# Patient Record
Sex: FEMALE | Race: WHITE | NOT HISPANIC OR LATINO | URBAN - METROPOLITAN AREA
[De-identification: names, ages, dates, MRNs, and addresses within clinical notes are randomized per-mention and may not be internally consistent; named-entity substitution may affect disease eponyms.]

---

## 2024-08-14 ENCOUNTER — EMERGENCY (EMERGENCY)
Facility: HOSPITAL | Age: 39
LOS: 1 days | Discharge: HOME | End: 2024-08-14
Attending: EMERGENCY MEDICINE
Payer: COMMERCIAL

## 2024-08-14 VITALS
WEIGHT: 266.32 LBS | SYSTOLIC BLOOD PRESSURE: 141 MMHG | HEART RATE: 115 BPM | OXYGEN SATURATION: 100 % | DIASTOLIC BLOOD PRESSURE: 100 MMHG | RESPIRATION RATE: 18 BRPM | TEMPERATURE: 98 F

## 2024-08-14 LAB
ALBUMIN SERPL ELPH-MCNC: 4.4 G/DL — SIGNIFICANT CHANGE UP (ref 3.3–5.2)
ALP SERPL-CCNC: 93 U/L — SIGNIFICANT CHANGE UP (ref 40–120)
ALT FLD-CCNC: 20 U/L — SIGNIFICANT CHANGE UP
ANION GAP SERPL CALC-SCNC: 14 MMOL/L — SIGNIFICANT CHANGE UP (ref 5–17)
APPEARANCE UR: CLEAR — SIGNIFICANT CHANGE UP
AST SERPL-CCNC: 16 U/L — SIGNIFICANT CHANGE UP
BACTERIA # UR AUTO: NEGATIVE /HPF — SIGNIFICANT CHANGE UP
BASOPHILS # BLD AUTO: 0.05 K/UL — SIGNIFICANT CHANGE UP (ref 0–0.2)
BASOPHILS NFR BLD AUTO: 0.5 % — SIGNIFICANT CHANGE UP (ref 0–2)
BILIRUB SERPL-MCNC: 0.5 MG/DL — SIGNIFICANT CHANGE UP (ref 0.4–2)
BILIRUB UR-MCNC: ABNORMAL
BUN SERPL-MCNC: 15.5 MG/DL — SIGNIFICANT CHANGE UP (ref 8–20)
CALCIUM SERPL-MCNC: 9.7 MG/DL — SIGNIFICANT CHANGE UP (ref 8.4–10.5)
CHLORIDE SERPL-SCNC: 101 MMOL/L — SIGNIFICANT CHANGE UP (ref 96–108)
CO2 SERPL-SCNC: 24 MMOL/L — SIGNIFICANT CHANGE UP (ref 22–29)
COLOR SPEC: ABNORMAL
CREAT SERPL-MCNC: 0.64 MG/DL — SIGNIFICANT CHANGE UP (ref 0.5–1.3)
DIFF PNL FLD: NEGATIVE — SIGNIFICANT CHANGE UP
EGFR: 116 ML/MIN/1.73M2 — SIGNIFICANT CHANGE UP
EOSINOPHIL # BLD AUTO: 0.07 K/UL — SIGNIFICANT CHANGE UP (ref 0–0.5)
EOSINOPHIL NFR BLD AUTO: 0.7 % — SIGNIFICANT CHANGE UP (ref 0–6)
GAS PNL BLDV: SIGNIFICANT CHANGE UP
GLUCOSE SERPL-MCNC: 89 MG/DL — SIGNIFICANT CHANGE UP (ref 70–99)
GLUCOSE UR QL: NEGATIVE MG/DL — SIGNIFICANT CHANGE UP
HCG SERPL-ACNC: <4 MIU/ML — SIGNIFICANT CHANGE UP
HCT VFR BLD CALC: 39.8 % — SIGNIFICANT CHANGE UP (ref 34.5–45)
HGB BLD-MCNC: 13.1 G/DL — SIGNIFICANT CHANGE UP (ref 11.5–15.5)
IMM GRANULOCYTES NFR BLD AUTO: 0.8 % — SIGNIFICANT CHANGE UP (ref 0–0.9)
KETONES UR-MCNC: NEGATIVE MG/DL — SIGNIFICANT CHANGE UP
LEUKOCYTE ESTERASE UR-ACNC: ABNORMAL
LIDOCAIN IGE QN: 32 U/L — SIGNIFICANT CHANGE UP (ref 22–51)
LYMPHOCYTES # BLD AUTO: 19.6 % — SIGNIFICANT CHANGE UP (ref 13–44)
LYMPHOCYTES # BLD AUTO: 2.05 K/UL — SIGNIFICANT CHANGE UP (ref 1–3.3)
MCHC RBC-ENTMCNC: 28.4 PG — SIGNIFICANT CHANGE UP (ref 27–34)
MCHC RBC-ENTMCNC: 32.9 GM/DL — SIGNIFICANT CHANGE UP (ref 32–36)
MCV RBC AUTO: 86.3 FL — SIGNIFICANT CHANGE UP (ref 80–100)
MONOCYTES # BLD AUTO: 0.66 K/UL — SIGNIFICANT CHANGE UP (ref 0–0.9)
MONOCYTES NFR BLD AUTO: 6.3 % — SIGNIFICANT CHANGE UP (ref 2–14)
NEUTROPHILS # BLD AUTO: 7.53 K/UL — HIGH (ref 1.8–7.4)
NEUTROPHILS NFR BLD AUTO: 72.1 % — SIGNIFICANT CHANGE UP (ref 43–77)
NITRITE UR-MCNC: POSITIVE
PH UR: 5 — SIGNIFICANT CHANGE UP (ref 5–8)
PLATELET # BLD AUTO: 294 K/UL — SIGNIFICANT CHANGE UP (ref 150–400)
POTASSIUM SERPL-MCNC: 4.2 MMOL/L — SIGNIFICANT CHANGE UP (ref 3.5–5.3)
POTASSIUM SERPL-SCNC: 4.2 MMOL/L — SIGNIFICANT CHANGE UP (ref 3.5–5.3)
PROT SERPL-MCNC: 8.1 G/DL — SIGNIFICANT CHANGE UP (ref 6.6–8.7)
PROT UR-MCNC: SIGNIFICANT CHANGE UP MG/DL
RBC # BLD: 4.61 M/UL — SIGNIFICANT CHANGE UP (ref 3.8–5.2)
RBC # FLD: 13.4 % — SIGNIFICANT CHANGE UP (ref 10.3–14.5)
RBC CASTS # UR COMP ASSIST: 2 /HPF — SIGNIFICANT CHANGE UP (ref 0–4)
SODIUM SERPL-SCNC: 139 MMOL/L — SIGNIFICANT CHANGE UP (ref 135–145)
SP GR SPEC: 1.02 — SIGNIFICANT CHANGE UP (ref 1–1.03)
SQUAMOUS # UR AUTO: 10 /HPF — HIGH (ref 0–5)
UROBILINOGEN FLD QL: 1 MG/DL — SIGNIFICANT CHANGE UP (ref 0.2–1)
WBC # BLD: 10.44 K/UL — SIGNIFICANT CHANGE UP (ref 3.8–10.5)
WBC # FLD AUTO: 10.44 K/UL — SIGNIFICANT CHANGE UP (ref 3.8–10.5)
WBC UR QL: 1 /HPF — SIGNIFICANT CHANGE UP (ref 0–5)

## 2024-08-14 PROCEDURE — 99285 EMERGENCY DEPT VISIT HI MDM: CPT

## 2024-08-14 PROCEDURE — 74176 CT ABD & PELVIS W/O CONTRAST: CPT | Mod: 26,MC

## 2024-08-14 RX ORDER — KETOROLAC TROMETHAMINE 10 MG
15 TABLET ORAL ONCE
Refills: 0 | Status: DISCONTINUED | OUTPATIENT
Start: 2024-08-14 | End: 2024-08-14

## 2024-08-14 RX ORDER — BACTERIOSTATIC SODIUM CHLORIDE 0.9 %
1000 VIAL (ML) INJECTION ONCE
Refills: 0 | Status: COMPLETED | OUTPATIENT
Start: 2024-08-14 | End: 2024-08-14

## 2024-08-14 RX ORDER — HYDROMORPHONE HCL IN 0.9% NACL 0.2 MG/ML
0.5 PLASTIC BAG, INJECTION (ML) INTRAVENOUS ONCE
Refills: 0 | Status: DISCONTINUED | OUTPATIENT
Start: 2024-08-14 | End: 2024-08-14

## 2024-08-14 RX ORDER — METHOCARBAMOL 500 MG
1000 TABLET ORAL ONCE
Refills: 0 | Status: COMPLETED | OUTPATIENT
Start: 2024-08-14 | End: 2024-08-14

## 2024-08-14 RX ORDER — ACETAMINOPHEN 500 MG
1000 TABLET ORAL ONCE
Refills: 0 | Status: COMPLETED | OUTPATIENT
Start: 2024-08-14 | End: 2024-08-14

## 2024-08-14 RX ORDER — ONDANSETRON HCL/PF 4 MG/2 ML
4 VIAL (ML) INJECTION ONCE
Refills: 0 | Status: COMPLETED | OUTPATIENT
Start: 2024-08-14 | End: 2024-08-14

## 2024-08-14 RX ADMIN — Medication 1000 MILLIGRAM(S): at 22:52

## 2024-08-14 RX ADMIN — Medication 1000 MILLILITER(S): at 22:53

## 2024-08-14 RX ADMIN — Medication 0.5 MILLIGRAM(S): at 22:53

## 2024-08-14 RX ADMIN — Medication 1000 MILLIGRAM(S): at 20:20

## 2024-08-14 RX ADMIN — Medication 1000 MILLILITER(S): at 20:04

## 2024-08-14 RX ADMIN — Medication 15 MILLIGRAM(S): at 21:21

## 2024-08-14 RX ADMIN — Medication 0.5 MILLIGRAM(S): at 21:26

## 2024-08-14 RX ADMIN — Medication 400 MILLIGRAM(S): at 20:03

## 2024-08-14 RX ADMIN — Medication 4 MILLIGRAM(S): at 20:03

## 2024-08-14 RX ADMIN — Medication 1000 MILLILITER(S): at 20:03

## 2024-08-14 RX ADMIN — Medication 15 MILLIGRAM(S): at 21:05

## 2024-08-14 RX ADMIN — Medication 4 MILLIGRAM(S): at 20:20

## 2024-08-14 NOTE — ED PROVIDER NOTE - CLINICAL SUMMARY MEDICAL DECISION MAKING FREE TEXT BOX
39 y/o female with pmhx  (), lap choly, hysterectomy presents to the ED for left flank pain. Upon exam, well appearing female with left CVAT.   -Labs revealing No leukocytosis, stable H+H, CMP wnl.   -UA revealing positive leuk esterase, nitrite positive   -CT renal stone hunt No evidence for obstructive uropathy. Punctate left lower pole calculi  -Patient reassessed throughout ED visit, pain persisting. ABX given for pyelo. Failed outpatient abx. Patient agreeable to stay on observation for pain control, fluids and abx

## 2024-08-14 NOTE — ED PROVIDER NOTE - OBJECTIVE STATEMENT
39 y/o female with pmhx  (), lap choly, hysterectomy presents to the ED for left flank pain. She was diagnosed with a UTI, UC put her on macrobid (on Day 3)and pyridium. She also reports nausea/vomiting and low grade fever at home. She was having dysuria, inc frequency, feeling of not complete voiding. Tylenol and motrin for pain. She is still is having urinary symptoms. She had the left flank pain monday but faint, now the pain is severe. 39 y/o female with pmhx  (), lap choly, hysterectomy presents to the ED for left flank pain. She was diagnosed with a UTI, UC put her on macrobid (on Day 3)and pyridium. She also reports nausea/vomiting and low grade fever at home. She was having dysuria, inc frequency, feeling of not complete voiding. Tylenol and motrin for pain. She is having persistent urinary symptoms. She had the left flank pain Monday however the pain was faint, now the pain is severe.  No SOB/CP, no numbness/tingling, no papitations, no headache, no throat pain.

## 2024-08-14 NOTE — ED ADULT NURSE NOTE - NS_SISCREENINGSR_GEN_ALL_ED
Pt has chronic adams. Uncertain how long adams has been in place for. Per pt a home care nurse did it but does know when. Pt thinks it is due to be changed soon. She gets it changed every month.  Per policy, recommended to change adams on admission. Updated Dr. Quintana.    Per Dr. Quintana, \"Can address later\"   Negative

## 2024-08-14 NOTE — ED ADULT TRIAGE NOTE - MODE OF ARRIVAL
Private Auto Walk in Doxepin Pregnancy And Lactation Text: This medication is Pregnancy Category C and it isn't known if it is safe during pregnancy. It is also excreted in breast milk and breast feeding isn't recommended.

## 2024-08-14 NOTE — ED ADULT NURSE NOTE - OBJECTIVE STATEMENT
Pt c/o flank pain, nausea, low grade fevers at home along with recent UTI dx. Pt reports she was prescribed abx & pyridium 3 days ago, no relief. RR even and unlabored on room air, NAD. Pt safety maintained.

## 2024-08-14 NOTE — ED ADULT TRIAGE NOTE - CHIEF COMPLAINT QUOTE
Diagnosed with UTI Monday given macrobid and pyridium taking as prescribed without relief. Pt now endorses sharp left flank pain associated with N/V and low grade fevers

## 2024-08-14 NOTE — ED PROVIDER NOTE - ATTENDING APP SHARED VISIT CONTRIBUTION OF CARE
37 y/o female with pmhx  (), lap choly, hysterectomy presents to the ED for left flank pain. Upon exam, well appearing female with left CVAT.   -Labs revealing No leukocytosis, stable H+H, CMP wnl.   -UA revealing positive leuk esterase, nitrite positive   -CT renal stone hunt No evidence for obstructive uropathy. Punctate left lower pole calculi  -Patient reassessed throughout ED visit, pain persisting. ABX given for pyelo. Failed outpatient abx. Patient agreeable to stay on observation for pain control, fluids and abx    Impression: Pyelonephritis    I, Alfonzo Ferguson, performed the initial face to face bedside interview with this patient regarding history of present illness, review of symptoms and relevant past medical, social and family history.  I completed an independent physical examination.  I was the initial provider who evaluated this patient. I have signed out the follow up of any pending tests (i.e. labs, radiological studies) to the ACP.  I have communicated the patient’s plan of care and disposition with the ACP.

## 2024-08-14 NOTE — ED PROVIDER NOTE - PHYSICAL EXAMINATION
Gen: No acute distress, non toxic female, speaking in full sentences   HEENT: NC/AT, Mucous membranes moist, Oropharynx without exudates, uvula midline  Eyes: pink conjunctivae, EOMI, PERRL  CV: RRR, nl s1/s2 noted   Resp: CTAB, normal rate and effort  GI: Abdomen soft, NT, ND. No rebound, no guarding  Neuro: A&O x 3, sensorimotor intact without deficits   (+) Left CVAT   MSK: No midline spinal tenderness to palpation, Full ROM ext x 4  Skin: No rashes. intact and perfused  Ambulatory in the ED

## 2024-08-15 DIAGNOSIS — Z90.49 ACQUIRED ABSENCE OF OTHER SPECIFIED PARTS OF DIGESTIVE TRACT: Chronic | ICD-10-CM

## 2024-08-15 DIAGNOSIS — Z98.891 HISTORY OF UTERINE SCAR FROM PREVIOUS SURGERY: Chronic | ICD-10-CM

## 2024-08-15 DIAGNOSIS — Z90.710 ACQUIRED ABSENCE OF BOTH CERVIX AND UTERUS: Chronic | ICD-10-CM

## 2024-08-15 LAB
CULTURE RESULTS: SIGNIFICANT CHANGE UP
SPECIMEN SOURCE: SIGNIFICANT CHANGE UP

## 2024-08-15 PROCEDURE — 99222 1ST HOSP IP/OBS MODERATE 55: CPT

## 2024-08-15 RX ORDER — KETOROLAC TROMETHAMINE 10 MG
15 TABLET ORAL EVERY 6 HOURS
Refills: 0 | Status: COMPLETED | OUTPATIENT
Start: 2024-08-15 | End: 2024-08-20

## 2024-08-15 RX ORDER — ONDANSETRON HCL/PF 4 MG/2 ML
4 VIAL (ML) INJECTION EVERY 6 HOURS
Refills: 0 | Status: ACTIVE | OUTPATIENT
Start: 2024-08-15 | End: 2025-07-14

## 2024-08-15 RX ORDER — HYDROMORPHONE HCL IN 0.9% NACL 0.2 MG/ML
0.5 PLASTIC BAG, INJECTION (ML) INTRAVENOUS EVERY 8 HOURS
Refills: 0 | Status: COMPLETED | OUTPATIENT
Start: 2024-08-15 | End: 2024-08-22

## 2024-08-15 RX ORDER — METOCLOPRAMIDE HCL 5 MG/ML
10 VIAL (ML) INJECTION ONCE
Refills: 0 | Status: COMPLETED | OUTPATIENT
Start: 2024-08-15 | End: 2024-08-15

## 2024-08-15 RX ORDER — ACETAMINOPHEN 500 MG
650 TABLET ORAL EVERY 6 HOURS
Refills: 0 | Status: ACTIVE | OUTPATIENT
Start: 2024-08-15 | End: 2025-07-14

## 2024-08-15 RX ORDER — DEXTROSE MONOHYDRATE, SODIUM CHLORIDE, SODIUM LACTATE, CALCIUM CHLORIDE, MAGNESIUM CHLORIDE 1.5; 538; 448; 18.4; 5.08 G/100ML; MG/100ML; MG/100ML; MG/100ML; MG/100ML
1000 SOLUTION INTRAPERITONEAL
Refills: 0 | Status: ACTIVE | OUTPATIENT
Start: 2024-08-15 | End: 2024-08-16

## 2024-08-15 RX ORDER — DIAZEPAM 5 MG/ML
5 VIAL (ML) INJECTION ONCE
Refills: 0 | Status: DISCONTINUED | OUTPATIENT
Start: 2024-08-15 | End: 2024-08-15

## 2024-08-15 RX ADMIN — Medication 1000 MILLIGRAM(S): at 23:40

## 2024-08-15 RX ADMIN — Medication 0.5 MILLIGRAM(S): at 00:51

## 2024-08-15 RX ADMIN — Medication 650 MILLIGRAM(S): at 11:04

## 2024-08-15 RX ADMIN — Medication 15 MILLIGRAM(S): at 07:20

## 2024-08-15 RX ADMIN — Medication 0.5 MILLIGRAM(S): at 07:20

## 2024-08-15 RX ADMIN — Medication 10 MILLIGRAM(S): at 10:14

## 2024-08-15 RX ADMIN — Medication 15 MILLIGRAM(S): at 22:41

## 2024-08-15 RX ADMIN — Medication 0.5 MILLIGRAM(S): at 15:47

## 2024-08-15 RX ADMIN — Medication 0.5 MILLIGRAM(S): at 06:26

## 2024-08-15 RX ADMIN — Medication 1000 MILLIGRAM(S): at 00:52

## 2024-08-15 RX ADMIN — Medication 0.5 MILLIGRAM(S): at 23:40

## 2024-08-15 RX ADMIN — Medication 15 MILLIGRAM(S): at 02:16

## 2024-08-15 RX ADMIN — DEXTROSE MONOHYDRATE, SODIUM CHLORIDE, SODIUM LACTATE, CALCIUM CHLORIDE, MAGNESIUM CHLORIDE 1000 MILLILITER(S): 1.5; 538; 448; 18.4; 5.08 SOLUTION INTRAPERITONEAL at 19:50

## 2024-08-15 RX ADMIN — Medication 15 MILLIGRAM(S): at 15:46

## 2024-08-15 RX ADMIN — Medication 4 MILLIGRAM(S): at 21:41

## 2024-08-15 RX ADMIN — Medication 0.5 MILLIGRAM(S): at 06:44

## 2024-08-15 RX ADMIN — Medication 5 MILLIGRAM(S): at 12:31

## 2024-08-15 RX ADMIN — Medication 15 MILLIGRAM(S): at 10:15

## 2024-08-15 RX ADMIN — Medication 650 MILLIGRAM(S): at 19:06

## 2024-08-15 RX ADMIN — Medication 15 MILLIGRAM(S): at 21:41

## 2024-08-15 RX ADMIN — DEXTROSE MONOHYDRATE, SODIUM CHLORIDE, SODIUM LACTATE, CALCIUM CHLORIDE, MAGNESIUM CHLORIDE 200 MILLILITER(S): 1.5; 538; 448; 18.4; 5.08 SOLUTION INTRAPERITONEAL at 10:14

## 2024-08-15 NOTE — ED CDU PROVIDER INITIAL DAY NOTE - NSICDXPASTSURGICALHX_GEN_ALL_CORE_FT
PAST SURGICAL HISTORY:  H/O  section     H/O: hysterectomy     History of laparoscopic cholecystectomy

## 2024-08-15 NOTE — ED CDU PROVIDER INITIAL DAY NOTE - OBJECTIVE STATEMENT
37 y/o female with pmhx  (), lap choly, hysterectomy presents to the ED for left flank pain. She was diagnosed with a UTI, UC put her on macrobid (on Day 3)and pyridium. She also reports nausea/vomiting and low grade fever at home. She was having dysuria, inc frequency, feeling of not complete voiding. Tylenol and motrin for pain. She is having persistent urinary symptoms. She had the left flank pain Monday however the pain was faint, now the pain is severe.

## 2024-08-15 NOTE — ED CDU PROVIDER INITIAL DAY NOTE - ATTENDING APP SHARED VISIT CONTRIBUTION OF CARE
I, Alfonzo Ferguson, performed the initial face to face bedside interview with this patient regarding history of present illness, review of symptoms and relevant past medical, social and family history.  I completed an independent physical examination.  I was the initial provider who evaluated this patient. I have signed out the follow up of any pending tests (i.e. labs, radiological studies) to the ACP.  I have communicated the patient’s plan of care and disposition with the ACP.

## 2024-08-15 NOTE — ED ADULT NURSE REASSESSMENT NOTE - CAS EDN INTEG ASSESS
Patient exposed to influenza B - was interpreting for a patient positive for this on day 4 of infection with profuse coughing directly into her face.  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    Tacho Villanueva, DO    
- - -

## 2024-08-15 NOTE — ED CDU PROVIDER INITIAL DAY NOTE - CLINICAL SUMMARY MEDICAL DECISION MAKING FREE TEXT BOX
37 y/o female with pmhx  (), lap choly, hysterectomy presents to the ED for left flank pain. Upon exam, well appearing female with left CVAT. ED record reviewed. Patient placed in observation for clinical pyelo diagnosis, Plan for pain control and IV fluids and antibiotics

## 2024-08-16 VITALS
RESPIRATION RATE: 17 BRPM | OXYGEN SATURATION: 99 % | DIASTOLIC BLOOD PRESSURE: 82 MMHG | TEMPERATURE: 98 F | SYSTOLIC BLOOD PRESSURE: 123 MMHG | HEART RATE: 88 BPM

## 2024-08-16 PROCEDURE — 84702 CHORIONIC GONADOTROPIN TEST: CPT

## 2024-08-16 PROCEDURE — 83605 ASSAY OF LACTIC ACID: CPT

## 2024-08-16 PROCEDURE — 99284 EMERGENCY DEPT VISIT MOD MDM: CPT | Mod: 25

## 2024-08-16 PROCEDURE — 80053 COMPREHEN METABOLIC PANEL: CPT

## 2024-08-16 PROCEDURE — 96375 TX/PRO/DX INJ NEW DRUG ADDON: CPT

## 2024-08-16 PROCEDURE — 81001 URINALYSIS AUTO W/SCOPE: CPT

## 2024-08-16 PROCEDURE — 84295 ASSAY OF SERUM SODIUM: CPT

## 2024-08-16 PROCEDURE — 82330 ASSAY OF CALCIUM: CPT

## 2024-08-16 PROCEDURE — 82947 ASSAY GLUCOSE BLOOD QUANT: CPT

## 2024-08-16 PROCEDURE — 96361 HYDRATE IV INFUSION ADD-ON: CPT

## 2024-08-16 PROCEDURE — 85014 HEMATOCRIT: CPT

## 2024-08-16 PROCEDURE — 85018 HEMOGLOBIN: CPT

## 2024-08-16 PROCEDURE — 96365 THER/PROPH/DIAG IV INF INIT: CPT

## 2024-08-16 PROCEDURE — 83690 ASSAY OF LIPASE: CPT

## 2024-08-16 PROCEDURE — 99239 HOSP IP/OBS DSCHRG MGMT >30: CPT

## 2024-08-16 PROCEDURE — 85025 COMPLETE CBC W/AUTO DIFF WBC: CPT

## 2024-08-16 PROCEDURE — 74176 CT ABD & PELVIS W/O CONTRAST: CPT | Mod: MC

## 2024-08-16 PROCEDURE — 82435 ASSAY OF BLOOD CHLORIDE: CPT

## 2024-08-16 PROCEDURE — 87040 BLOOD CULTURE FOR BACTERIA: CPT

## 2024-08-16 PROCEDURE — 36415 COLL VENOUS BLD VENIPUNCTURE: CPT

## 2024-08-16 PROCEDURE — 96376 TX/PRO/DX INJ SAME DRUG ADON: CPT

## 2024-08-16 PROCEDURE — 84132 ASSAY OF SERUM POTASSIUM: CPT

## 2024-08-16 PROCEDURE — G0378: CPT

## 2024-08-16 PROCEDURE — 87086 URINE CULTURE/COLONY COUNT: CPT

## 2024-08-16 PROCEDURE — 82803 BLOOD GASES ANY COMBINATION: CPT

## 2024-08-16 RX ORDER — CEFPODOXIME PROXETIL 100 MG
1 TABLET ORAL
Qty: 14 | Refills: 0
Start: 2024-08-16 | End: 2024-08-22

## 2024-08-16 RX ADMIN — Medication 650 MILLIGRAM(S): at 08:19

## 2024-08-16 RX ADMIN — Medication 0.5 MILLIGRAM(S): at 08:19

## 2024-08-16 RX ADMIN — Medication 650 MILLIGRAM(S): at 02:24

## 2024-08-16 RX ADMIN — Medication 650 MILLIGRAM(S): at 03:29

## 2024-08-16 RX ADMIN — Medication 15 MILLIGRAM(S): at 02:24

## 2024-08-16 RX ADMIN — Medication 4 MILLIGRAM(S): at 04:08

## 2024-08-16 RX ADMIN — Medication 0.5 MILLIGRAM(S): at 00:40

## 2024-08-16 RX ADMIN — Medication 15 MILLIGRAM(S): at 03:29

## 2024-08-16 RX ADMIN — Medication 15 MILLIGRAM(S): at 08:19

## 2024-08-16 NOTE — ED CDU PROVIDER SUBSEQUENT DAY NOTE - HISTORY
Pt resting comfortably at time of re-assessment. No events overnight. Pending IV abx, am re-assessment.  Will continue to monitor.

## 2024-08-16 NOTE — ED CDU PROVIDER DISPOSITION NOTE - PATIENT PORTAL LINK FT
You can access the FollowMyHealth Patient Portal offered by [facility] by registering at the following website: [email or web address]. By joining [facility]’s FollowMyHealth portal, you will also be able to view your health information using other applications (apps) compatible with our system.

## 2024-08-16 NOTE — ED CDU PROVIDER DISPOSITION NOTE - CLINICAL COURSE
38-year-old female  presenting to the ER with left flank pain 2 days ago, was diagnosed by urgent care with a UTI 3 days prior to her presentation and placed on Macrobid and Pyridium.  Symptoms  or worsened despite treatment.  Placed in observation for IV ceftriaxone for treatment of pyelonephritis.  This morning reports significant improvement in her pain, states she is still having some burning but that it has improved since being in the hospital.  Will discharge on oral cefpodoxime 100 mg twice daily for a week, offered pain medications but declined stating she will take Motrin at home.  Advised to follow-up with her primary care doctor within the week.

## 2024-08-16 NOTE — ED CDU PROVIDER SUBSEQUENT DAY NOTE - ATTENDING APP SHARED VISIT CONTRIBUTION OF CARE
38-year-old female  presents with left flank pain.  Patient was put on Macrobid and Pyridium that was not improved. she was found to have pyelonephritis.  Patient received Rocephin and pain control.  Patient report feeling better today with pain under control.  Comfortable going home on oral antibiotics.

## 2024-08-16 NOTE — ED ADULT NURSE REASSESSMENT NOTE - NS ED NURSE REASSESS COMMENT FT1
Assuming care from JERO THOMAS, plan of care, reason for hospital visit were reviewed, introduced to patient, updated patient on plan of care. Education deemed successful after teach back shows proficiency, VS recorded in the EMR.
Patient resting comfortably in bed. No acute distress noted. Patient has no complaints at this time. Patient medicated for pain. Patient administered IV Rocephin.  Patient will contniue to receive IV ABX and be reassessed in AM.
Patient resting comfortably in bed. No acute distress noted. Patient has no complaints at this time. Patient to receive IV ABX.
pt appears to be more comfortable after meds as per emar. plan for another dose of antibiotics
Assumed care of pt at 07:15 as stated in report from JERO Burris. Charting as noted. Patient A&O x4, denies pain/discomfort, denies CP/SOB. Updated on the plan of care. Call bell within reach, bed locked in lowest position. IV site flushed w/ NS. No redness, swelling or pain noted to site. No signs of acute distress noted, safety maintained.
Assumed care from RN CRESCENCIO. Patient is A&Ox3. Patient resting comfortably in bed. No acute distress noted. Patient has no complaints at this time. IV access intact. Patient to receive IV ABX and be reassessed in AM.

## 2024-08-16 NOTE — ED CDU PROVIDER SUBSEQUENT DAY NOTE - CLINICAL SUMMARY MEDICAL DECISION MAKING FREE TEXT BOX
39 y/o female with pmhx  (), lap choly, hysterectomy presents to the ED for left flank pain. Upon exam, well appearing female with left CVAT. ED record reviewed. Patient placed in observation for clinical pyelo diagnosis, Plan for pain control and IV fluids and antibiotics

## 2024-08-16 NOTE — ED CDU PROVIDER DISPOSITION NOTE - NSFOLLOWUPINSTRUCTIONS_ED_ALL_ED_FT
- Cefpodoxime 100mg twice a day for the next week.  - Motrin every 4-6 hours if needed for pain.  - Follow up with your primary care doctor in the next week.  - Return to the ER if you have any concerns.

## 2024-08-16 NOTE — ED CDU PROVIDER SUBSEQUENT DAY NOTE - NS ED ROS FT
Gen: denies fever, chills, fatigue, weight loss  Skin: denies rashes, laceration, bruising  HEENT: denies visual changes, ear pain, nasal congestion, throat pain  Respiratory: denies JOHNS, SOB, cough, wheezing  Cardiovascular: denies chest pain, palpitations, diaphoresis, LE edema  GI: denies abdominal pain, n/v/d  : denies dysuria, frequency, urgency, bowel/bladder incontinence  MSK: denies joint swelling/pain, back pain, neck pain  Neuro: denies headache, dizziness, weakness, numbness  Psych: denies anxiety, depression, SI/HI, visual/auditory hallucinations

## 2024-08-20 LAB
CULTURE RESULTS: SIGNIFICANT CHANGE UP
SPECIMEN SOURCE: SIGNIFICANT CHANGE UP